# Patient Record
Sex: FEMALE | Race: WHITE | NOT HISPANIC OR LATINO | Employment: FULL TIME | URBAN - METROPOLITAN AREA
[De-identification: names, ages, dates, MRNs, and addresses within clinical notes are randomized per-mention and may not be internally consistent; named-entity substitution may affect disease eponyms.]

---

## 2024-05-11 ENCOUNTER — APPOINTMENT (EMERGENCY)
Dept: RADIOLOGY | Facility: HOSPITAL | Age: 54
End: 2024-05-11
Payer: COMMERCIAL

## 2024-05-11 ENCOUNTER — HOSPITAL ENCOUNTER (EMERGENCY)
Facility: HOSPITAL | Age: 54
Discharge: HOME | End: 2024-05-11
Admitting: EMERGENCY MEDICINE
Payer: COMMERCIAL

## 2024-05-11 VITALS
SYSTOLIC BLOOD PRESSURE: 103 MMHG | RESPIRATION RATE: 22 BRPM | OXYGEN SATURATION: 95 % | HEART RATE: 70 BPM | DIASTOLIC BLOOD PRESSURE: 59 MMHG | TEMPERATURE: 98.4 F | WEIGHT: 152 LBS

## 2024-05-11 DIAGNOSIS — R07.9 CHEST PAIN, UNSPECIFIED: Primary | ICD-10-CM

## 2024-05-11 LAB
2HR DELTA HS TROPONIN: 0 NG/L
ALBUMIN SERPL BCP-MCNC: 4.6 G/DL (ref 3.5–5)
ALP SERPL-CCNC: 69 U/L (ref 34–104)
ALT SERPL W P-5'-P-CCNC: 11 U/L (ref 7–52)
ANION GAP SERPL CALCULATED.3IONS-SCNC: 7 MMOL/L (ref 4–13)
AST SERPL W P-5'-P-CCNC: 16 U/L (ref 13–39)
ATRIAL RATE: 59 BPM
ATRIAL RATE: 73 BPM
ATRIAL RATE: 76 BPM
BASOPHILS # BLD AUTO: 0.05 THOUSANDS/ÂΜL (ref 0–0.1)
BASOPHILS NFR BLD AUTO: 1 % (ref 0–1)
BILIRUB SERPL-MCNC: 1.52 MG/DL (ref 0.2–1)
BUN SERPL-MCNC: 16 MG/DL (ref 5–25)
CALCIUM SERPL-MCNC: 9.5 MG/DL (ref 8.4–10.2)
CARDIAC TROPONIN I PNL SERPL HS: 3 NG/L
CARDIAC TROPONIN I PNL SERPL HS: 3 NG/L
CHLORIDE SERPL-SCNC: 108 MMOL/L (ref 96–108)
CO2 SERPL-SCNC: 26 MMOL/L (ref 21–32)
CREAT SERPL-MCNC: 0.8 MG/DL (ref 0.6–1.3)
EOSINOPHIL # BLD AUTO: 0.07 THOUSAND/ÂΜL (ref 0–0.61)
EOSINOPHIL NFR BLD AUTO: 1 % (ref 0–6)
ERYTHROCYTE [DISTWIDTH] IN BLOOD BY AUTOMATED COUNT: 13.1 % (ref 11.6–15.1)
GFR SERPL CREATININE-BSD FRML MDRD: 83 ML/MIN/1.73SQ M
GLUCOSE SERPL-MCNC: 101 MG/DL (ref 65–140)
HCT VFR BLD AUTO: 39.1 % (ref 34.8–46.1)
HGB BLD-MCNC: 12.6 G/DL (ref 11.5–15.4)
IMM GRANULOCYTES # BLD AUTO: 0.02 THOUSAND/UL (ref 0–0.2)
IMM GRANULOCYTES NFR BLD AUTO: 0 % (ref 0–2)
LYMPHOCYTES # BLD AUTO: 2.17 THOUSANDS/ÂΜL (ref 0.6–4.47)
LYMPHOCYTES NFR BLD AUTO: 32 % (ref 14–44)
MCH RBC QN AUTO: 27.7 PG (ref 26.8–34.3)
MCHC RBC AUTO-ENTMCNC: 32.2 G/DL (ref 31.4–37.4)
MCV RBC AUTO: 86 FL (ref 82–98)
MONOCYTES # BLD AUTO: 0.6 THOUSAND/ÂΜL (ref 0.17–1.22)
MONOCYTES NFR BLD AUTO: 9 % (ref 4–12)
NEUTROPHILS # BLD AUTO: 3.97 THOUSANDS/ÂΜL (ref 1.85–7.62)
NEUTS SEG NFR BLD AUTO: 57 % (ref 43–75)
NRBC BLD AUTO-RTO: 0 /100 WBCS
P AXIS: 48 DEGREES
P AXIS: 74 DEGREES
P AXIS: 78 DEGREES
PLATELET # BLD AUTO: 311 THOUSANDS/UL (ref 149–390)
PMV BLD AUTO: 10.5 FL (ref 8.9–12.7)
POTASSIUM SERPL-SCNC: 3.8 MMOL/L (ref 3.5–5.3)
PR INTERVAL: 130 MS
PR INTERVAL: 144 MS
PR INTERVAL: 146 MS
PROT SERPL-MCNC: 7.1 G/DL (ref 6.4–8.4)
QRS AXIS: 82 DEGREES
QRS AXIS: 84 DEGREES
QRS AXIS: 84 DEGREES
QRSD INTERVAL: 84 MS
QRSD INTERVAL: 86 MS
QRSD INTERVAL: 88 MS
QT INTERVAL: 376 MS
QT INTERVAL: 400 MS
QT INTERVAL: 424 MS
QTC INTERVAL: 419 MS
QTC INTERVAL: 423 MS
QTC INTERVAL: 440 MS
RBC # BLD AUTO: 4.55 MILLION/UL (ref 3.81–5.12)
SODIUM SERPL-SCNC: 141 MMOL/L (ref 135–147)
T WAVE AXIS: 54 DEGREES
T WAVE AXIS: 54 DEGREES
T WAVE AXIS: 60 DEGREES
VENTRICULAR RATE: 59 BPM
VENTRICULAR RATE: 73 BPM
VENTRICULAR RATE: 76 BPM
WBC # BLD AUTO: 6.88 THOUSAND/UL (ref 4.31–10.16)

## 2024-05-11 PROCEDURE — 99285 EMERGENCY DEPT VISIT HI MDM: CPT

## 2024-05-11 PROCEDURE — 93005 ELECTROCARDIOGRAM TRACING: CPT

## 2024-05-11 PROCEDURE — 80053 COMPREHEN METABOLIC PANEL: CPT

## 2024-05-11 PROCEDURE — 84484 ASSAY OF TROPONIN QUANT: CPT

## 2024-05-11 PROCEDURE — 85025 COMPLETE CBC W/AUTO DIFF WBC: CPT

## 2024-05-11 PROCEDURE — 71046 X-RAY EXAM CHEST 2 VIEWS: CPT

## 2024-05-11 PROCEDURE — 93010 ELECTROCARDIOGRAM REPORT: CPT | Performed by: INTERNAL MEDICINE

## 2024-05-11 PROCEDURE — 36415 COLL VENOUS BLD VENIPUNCTURE: CPT

## 2024-05-11 RX ORDER — ASPIRIN 325 MG
325 TABLET ORAL ONCE
Status: COMPLETED | OUTPATIENT
Start: 2024-05-11 | End: 2024-05-11

## 2024-05-11 RX ORDER — LORAZEPAM 1 MG/1
1 TABLET ORAL ONCE
Status: COMPLETED | OUTPATIENT
Start: 2024-05-11 | End: 2024-05-11

## 2024-05-11 RX ADMIN — ASPIRIN 325 MG ORAL TABLET 325 MG: 325 PILL ORAL at 07:40

## 2024-05-11 RX ADMIN — LORAZEPAM 1 MG: 1 TABLET ORAL at 09:05

## 2024-05-11 NOTE — ED PROVIDER NOTES
History  Chief Complaint   Patient presents with    Chest Pain     Pt reports chest pressure that started around 0300. Pt states it woke her out of her sleep. + radiating pains into R shoulder and L arm, which has since improved. + sob. Denies cardiac hx.      55 y/o female patient presents to the ER for evaluation of chest pain.PMH: Crohns. Patient stated that she was sleeping and when she woke up she started to have generalized chest pain. She stated that she initially felt like the pain radiated into her left arm, however the radiation of pain resolved when she rolled over to her right side. Patient stated that the pain is dull and aching. Pain not worse with movement. No medications taken prior to arrival. She is actively crying while taking history and reports that she has been under a lot of stress with her family. She reports that this pain and intermittent fluttering has been going on for a while and has gotten worse last night.  She stated that she does not have cardiac history. Parents has hx MI, CAD, CVA. No recent long travel, leg swelling, hx smoking or hormone therapy. No noted shortness of breath, dizziness, or syncope.  Denies fever, chills, URI symptoms, vomiting or diarrhea.       History provided by:  Patient      None       Past Medical History:   Diagnosis Date    Crohn's disease (HCC)        History reviewed. No pertinent surgical history.    History reviewed. No pertinent family history.  I have reviewed and agree with the history as documented.    E-Cigarette/Vaping     E-Cigarette/Vaping Substances     Social History     Tobacco Use    Smoking status: Never    Smokeless tobacco: Never   Substance Use Topics    Alcohol use: Never    Drug use: Never       Review of Systems   Constitutional:  Negative for chills and fever.   HENT:  Negative for ear pain and sore throat.    Eyes:  Negative for pain and visual disturbance.   Respiratory:  Negative for cough and shortness of breath.     Cardiovascular:  Positive for chest pain. Negative for palpitations.   Gastrointestinal:  Negative for abdominal pain, diarrhea, nausea and vomiting.   Genitourinary:  Negative for dysuria and hematuria.   Musculoskeletal:  Negative for arthralgias and back pain.   Skin:  Negative for color change and rash.   Neurological:  Negative for seizures and syncope.   Psychiatric/Behavioral:  The patient is nervous/anxious.    All other systems reviewed and are negative.      Physical Exam  Physical Exam  Vitals and nursing note reviewed.   Constitutional:       General: She is not in acute distress.     Appearance: She is well-developed.   HENT:      Head: Normocephalic and atraumatic.   Eyes:      Conjunctiva/sclera: Conjunctivae normal.   Cardiovascular:      Rate and Rhythm: Normal rate and regular rhythm.      Heart sounds: Normal heart sounds. No murmur heard.  Pulmonary:      Effort: Pulmonary effort is normal. No respiratory distress.      Breath sounds: Normal breath sounds.   Abdominal:      Palpations: Abdomen is soft.      Tenderness: There is no abdominal tenderness.   Musculoskeletal:         General: No swelling. Normal range of motion.      Cervical back: Neck supple.   Skin:     General: Skin is warm and dry.      Capillary Refill: Capillary refill takes less than 2 seconds.   Neurological:      Mental Status: She is alert and oriented to person, place, and time.   Psychiatric:         Mood and Affect: Mood is anxious. Affect is tearful.         Behavior: Behavior normal.         Vital Signs  ED Triage Vitals   Temperature Pulse Respirations Blood Pressure SpO2   05/11/24 0643 05/11/24 0641 05/11/24 0641 05/11/24 0641 05/11/24 0641   98.3 °F (36.8 °C) 76 20 137/94 98 %      Temp Source Heart Rate Source Patient Position - Orthostatic VS BP Location FiO2 (%)   05/11/24 0643 05/11/24 0641 05/11/24 0641 05/11/24 0641 --   Oral Monitor Sitting Right arm       Pain Score       05/11/24 0641       7            Vitals:    05/11/24 0641 05/11/24 0701 05/11/24 0820 05/11/24 0907   BP: 137/94 151/93 121/81 128/79   Pulse: 76 76 67 70   Patient Position - Orthostatic VS: Sitting Lying Lying Lying         Visual Acuity      ED Medications  Medications   aspirin tablet 325 mg (325 mg Oral Given 5/11/24 0740)   LORazepam (ATIVAN) tablet 1 mg (1 mg Oral Given 5/11/24 0905)       Diagnostic Studies  Results Reviewed       Procedure Component Value Units Date/Time    HS Troponin I 2hr [[id number]]  (Normal) Collected: 05/11/24 0918    Lab Status: Final result Specimen: Blood from Arm, Left Updated: 05/11/24 0947     hs TnI 2hr 3 ng/L      Delta 2hr hsTnI 0 ng/L     HS Troponin I 4hr [[id number]]     Lab Status: No result Specimen: Blood     HS Troponin 0hr (reflex protocol) [[id number]]  (Normal) Collected: 05/11/24 0708    Lab Status: Final result Specimen: Blood from Arm, Right Updated: 05/11/24 0735     hs TnI 0hr 3 ng/L     Comprehensive metabolic panel [[id number]]  (Abnormal) Collected: 05/11/24 0708    Lab Status: Final result Specimen: Blood from Arm, Right Updated: 05/11/24 0734     Sodium 141 mmol/L      Potassium 3.8 mmol/L      Chloride 108 mmol/L      CO2 26 mmol/L      ANION GAP 7 mmol/L      BUN 16 mg/dL      Creatinine 0.80 mg/dL      Glucose 101 mg/dL      Calcium 9.5 mg/dL      AST 16 U/L      ALT 11 U/L      Alkaline Phosphatase 69 U/L      Total Protein 7.1 g/dL      Albumin 4.6 g/dL      Total Bilirubin 1.52 mg/dL      eGFR 83 ml/min/1.73sq m     Narrative:      [employer] guidelines for Chronic Kidney Disease (CKD):     Stage 1 with normal or high GFR (GFR > 90 mL/min/1.73 square meters)    Stage 2 Mild CKD (GFR = 60-89 mL/min/1.73 square meters)    Stage 3A Moderate CKD (GFR = 45-59 mL/min/1.73 square meters)    Stage 3B Moderate CKD (GFR = 30-44 mL/min/1.73 square meters)    Stage 4 Severe CKD (GFR = 15-29 mL/min/1.73 square meters)    Stage 5 End Stage CKD (GFR <15 mL/min/1.73  square meters)  Note: GFR calculation is accurate only with a steady state creatinine    CBC and differential [[id number]] Collected: 05/11/24 0708    Lab Status: Final result Specimen: Blood from Arm, Right Updated: 05/11/24 0714     WBC 6.88 Thousand/uL      RBC 4.55 Million/uL      Hemoglobin 12.6 g/dL      Hematocrit 39.1 %      MCV 86 fL      MCH 27.7 pg      MCHC 32.2 g/dL      RDW 13.1 %      MPV 10.5 fL      Platelets 311 Thousands/uL      nRBC 0 /100 WBCs      Segmented % 57 %      Immature Grans % 0 %      Lymphocytes % 32 %      Monocytes % 9 %      Eosinophils Relative 1 %      Basophils Relative 1 %      Absolute Neutrophils 3.97 Thousands/µL      Absolute Immature Grans 0.02 Thousand/uL      Absolute Lymphocytes 2.17 Thousands/µL      Absolute Monocytes 0.60 Thousand/µL      Eosinophils Absolute 0.07 Thousand/µL      Basophils Absolute 0.05 Thousands/µL                    X-ray chest 2 views   Final Result by Catia Lo MD (05/11 0908)      No acute cardiopulmonary disease.            Workstation performed: [id number]                    Procedures  ECG 12 Lead Documentation Only    Date/Time: 5/11/2024 6:43 AM    Performed by: DARYL Tovar  Authorized by: DARYL Tovar    Indications / Diagnosis:  Chest pain  ECG reviewed by me, the ED Provider: yes    Patient location:  ED  Previous ECG:     Previous ECG:  Unavailable    Comparison to cardiac monitor: Yes    Interpretation:     Interpretation: normal    Rate:     ECG rate:  73    ECG rate assessment: normal    Rhythm:     Rhythm: sinus rhythm    Ectopy:     Ectopy: none    QRS:     QRS axis:  Normal    QRS intervals:  Normal  Conduction:     Conduction: normal    ST segments:     ST segments:  Normal  T waves:     T waves: normal             ED Course  ED Course as of 05/11/24 1033   Sat May 11, 2024   0720 X-ray chest 2 views  Radiology studies have been independently visualized by me.  Preliminary interpretation: no cardiopulmonary  abdnormalities  All radiology studies will be officially read by the radiologist and any discrepancies flagged and follow through the discrepancy management process to make the patient aware of significant results.     0918 Awaiting delta troponin and EKG for disposition. Patient reports feeling better at this time.              HEART Risk Score      Flowsheet Row Most Recent Value   Heart Score Risk Calculator    History 1 Filed at: 05/11/2024 1032   ECG 0 Filed at: 05/11/2024 1032   Age 1 Filed at: 05/11/2024 1032   Risk Factors 1 Filed at: 05/11/2024 1032   Troponin 0 Filed at: 05/11/2024 1032   HEART Score 3 Filed at: 05/11/2024 1032                          SBIRT 20yo+      Flowsheet Row Most Recent Value   Initial Alcohol Screen: US AUDIT-C     1. How often do you have a drink containing alcohol? 0 Filed at: 05/11/2024 0645   2. How many drinks containing alcohol do you have on a typical day you are drinking?  0 Filed at: 05/11/2024 0645   3b. FEMALE Any Age, or MALE 65+: How often do you have 4 or more drinks on one occassion? 0 Filed at: 05/11/2024 0645   Audit-C Score 0 Filed at: 05/11/2024 0645   DUYEN: How many times in the past year have you...    Used an illegal drug or used a prescription medication for non-medical reasons? Never Filed at: 05/11/2024 0645                      Medical Decision Making  Exam without evidence of volume overload so doubt heart failure.  EKG without signs of active ischemia.  Troponin 3, delta troponin 3, so doubt NSTEMI.  Presentation not consistent with acute PE, pneumothorax, pericarditis, tamponade, pneumonia.  Heart score: 3  CBC negative for leukocytosis, anemia.  CMP negative for metabolic derangements. Patient given ativan for anxiety, anxiety was lessen and patient was given outpatient resources for psych/therapy. Follow-up with cardiology.  Return to ER symptoms worsens or questions or concerns arise at home.      Amount and/or Complexity of Data Reviewed  Labs:  ordered.  Radiology: ordered. Decision-making details documented in ED Course.    Risk  OTC drugs.  Prescription drug management.             Disposition  Final diagnoses:   Chest pain, unspecified     Time reflects when diagnosis was documented in both MDM as applicable and the Disposition within this note       Time User Action Codes Description Comment    5/11/2024 10:12 AM Cristina Cormier Add [R07.9] Chest pain, unspecified           ED Disposition       ED Disposition   Discharge    Condition   Stable    Date/Time   Sat May 11, 2024 1012    Comment   [place] discharge to home/self care.                   Follow-up Information       Follow up With Specialties Details Why Contact Info Additional Information    [facility] Emergency Department Emergency Medicine   [address] [facility] [address]  [phone] [facility] Emergency Department, [address], [place], [address]    [facility] Cardiology [facility]   [address] [address]  [phone] [facility], [address] [facility], [place] [address], [place], [place], [address]   [phone]            Patient's Medications    No medications on file       No discharge procedures on file.    PDMP Review       None            ED Provider  Electronically Signed by             DARYL Tovar  05/11/24 1033